# Patient Record
Sex: MALE | URBAN - METROPOLITAN AREA
[De-identification: names, ages, dates, MRNs, and addresses within clinical notes are randomized per-mention and may not be internally consistent; named-entity substitution may affect disease eponyms.]

---

## 2019-11-13 ENCOUNTER — OFFICE VISIT (OUTPATIENT)
Dept: URBAN - METROPOLITAN AREA CLINIC 88 | Facility: CLINIC | Age: 64
End: 2019-11-13
Payer: MEDICARE

## 2019-11-13 DIAGNOSIS — H25.12 AGE-RELATED NUCLEAR CATARACT, LEFT EYE: ICD-10-CM

## 2019-11-13 DIAGNOSIS — H25.811 COMBINED FORMS OF AGE-RELATED CATARACT, RIGHT EYE: Primary | ICD-10-CM

## 2019-11-13 DIAGNOSIS — H43.811 VITREOUS DEGENERATION, RIGHT EYE: ICD-10-CM

## 2019-11-13 PROCEDURE — 92136 OPHTHALMIC BIOMETRY: CPT | Performed by: OPHTHALMOLOGY

## 2019-11-13 PROCEDURE — 92004 COMPRE OPH EXAM NEW PT 1/>: CPT | Performed by: OPHTHALMOLOGY

## 2019-11-13 RX ORDER — OFLOXACIN 3 MG/ML
0.3 % SOLUTION/ DROPS OPHTHALMIC
Qty: 1 | Refills: 0 | Status: INACTIVE
Start: 2019-11-13 | End: 2019-12-06

## 2019-11-13 RX ORDER — PREDNISOLONE ACETATE 10 MG/ML
1 % SUSPENSION/ DROPS OPHTHALMIC
Qty: 1 | Refills: 1 | Status: ACTIVE
Start: 2019-11-13

## 2019-11-13 RX ORDER — DICLOFENAC SODIUM 1 MG/ML
0.1 % SOLUTION/ DROPS OPHTHALMIC
Qty: 1 | Refills: 1 | Status: INACTIVE
Start: 2019-11-13 | End: 2019-12-06

## 2019-11-13 ASSESSMENT — INTRAOCULAR PRESSURE
OS: 14
OD: 14

## 2019-11-13 ASSESSMENT — VISUAL ACUITY
OD: 20/50
OS: 20/20

## 2019-11-13 ASSESSMENT — KERATOMETRY
OS: 39.50
OD: 39.50

## 2019-11-13 NOTE — IMPRESSION/PLAN
Impression: Vitreous degeneration, right eye: H43.811. OD. Plan: Discussed diagnosis in detail with patient. There is no evidence of retinal pathology. All signs and risks of retinal detachment and tears were discussed in detail. Patient was advised if any changes of VA, flashes, floaters or if curtain fall in field of [address] [facility]. Patient instructed to call the office immediately if any symptoms noted.

## 2019-11-13 NOTE — IMPRESSION/PLAN
Impression: Combined forms of age-related cataract, right eye: H25.811. OD. Condition: worsening. Vision: vision affected. Plan: Cataracts account for the patient's complaints. Discussed all risks, benefits, procedures and recovery. Patient understands changing glasses will not improve vision. Patient desires to have surgery, recommend phacoemulsification with intraocular lens. Surgical risks and benefits were discussed, explained and to patient. RL2 Schedule Cataract sx OD Final post operative refractive decision will be made by Operative Surgeon. Pred-Moxi-Nepafenac/generic drops We recommend FBS and HgbA1c due to the presence of microaneurysms OU and family history of Diabetes Mellitus.

## 2019-12-05 ENCOUNTER — Encounter (OUTPATIENT)
Dept: URBAN - METROPOLITAN AREA EXTERNAL CLINIC 48 | Facility: EXTERNAL CLINIC | Age: 64
End: 2019-12-05
Payer: MEDICARE

## 2019-12-05 PROCEDURE — 66984 XCAPSL CTRC RMVL W/O ECP: CPT | Performed by: OPHTHALMOLOGY

## 2019-12-06 ENCOUNTER — POST-OPERATIVE VISIT (OUTPATIENT)
Dept: URBAN - METROPOLITAN AREA CLINIC 88 | Facility: CLINIC | Age: 64
End: 2019-12-06

## 2019-12-06 PROCEDURE — 99024 POSTOP FOLLOW-UP VISIT: CPT | Performed by: OPTOMETRIST

## 2019-12-06 ASSESSMENT — INTRAOCULAR PRESSURE: OD: 13

## 2019-12-13 ENCOUNTER — POST-OPERATIVE VISIT (OUTPATIENT)
Dept: URBAN - METROPOLITAN AREA CLINIC 88 | Facility: CLINIC | Age: 64
End: 2019-12-13

## 2019-12-13 PROCEDURE — 99024 POSTOP FOLLOW-UP VISIT: CPT | Performed by: OPTOMETRIST

## 2019-12-13 ASSESSMENT — INTRAOCULAR PRESSURE: OD: 13

## 2019-12-13 ASSESSMENT — VISUAL ACUITY: OD: 20/20

## 2020-01-03 ENCOUNTER — POST-OPERATIVE VISIT (OUTPATIENT)
Dept: URBAN - METROPOLITAN AREA CLINIC 88 | Facility: CLINIC | Age: 65
End: 2020-01-03

## 2020-01-03 DIAGNOSIS — Z09 ENCNTR FOR F/U EXAM AFT TRTMT FOR COND OTH THAN MALIG NEOPLM: Primary | ICD-10-CM

## 2020-01-03 DIAGNOSIS — H52.4 PRESBYOPIA: ICD-10-CM

## 2020-01-03 PROCEDURE — 99024 POSTOP FOLLOW-UP VISIT: CPT | Performed by: OPTOMETRIST

## 2020-01-03 ASSESSMENT — VISUAL ACUITY
OS: 20/20
OD: 20/20

## 2020-01-03 ASSESSMENT — INTRAOCULAR PRESSURE
OS: 14
OD: 13